# Patient Record
Sex: MALE | Race: WHITE | Employment: UNEMPLOYED | URBAN - METROPOLITAN AREA
[De-identification: names, ages, dates, MRNs, and addresses within clinical notes are randomized per-mention and may not be internally consistent; named-entity substitution may affect disease eponyms.]

---
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
## 2025-03-04 ENCOUNTER — HOSPITAL ENCOUNTER (EMERGENCY)
Age: 20
Discharge: HOME | End: 2025-03-04
Payer: COMMERCIAL

## 2025-03-04 VITALS
SYSTOLIC BLOOD PRESSURE: 139 MMHG | DIASTOLIC BLOOD PRESSURE: 68 MMHG | TEMPERATURE: 98.5 F | RESPIRATION RATE: 17 BRPM | HEART RATE: 75 BPM | OXYGEN SATURATION: 99 %

## 2025-03-04 DIAGNOSIS — B00.9 HERPES SIMPLEX: Primary | ICD-10-CM

## 2025-03-04 LAB
BILIRUB UR QL STRIP: NEGATIVE
CHLAMYDIA TRACHOMATIS MOLECULAR: NOT DETECTED
CLARITY UR: CLEAR
COLOR UR: YELLOW
COMMENT: ABNORMAL
GLUCOSE UR STRIP-MCNC: NEGATIVE MG/DL
HGB UR QL STRIP.AUTO: NEGATIVE
KETONES UR STRIP-MCNC: NEGATIVE MG/DL
LEUKOCYTE ESTERASE UR QL STRIP: NEGATIVE
NEISSERIA GONORRHOEAE MOLECULAR: NOT DETECTED
NITRITE UR QL STRIP: NEGATIVE
PH UR STRIP: 7 [PH] (ref 5–8)
PROT UR STRIP-MCNC: NEGATIVE MG/DL
SOURCE: NORMAL
SP GR UR STRIP: 1.01 (ref 1–1.03)
TRICHOMONAS VAGINALI, MOLECULAR: NOT DETECTED
UROBILINOGEN UR STRIP-ACNC: 2 EU/DL (ref 0–1)

## 2025-03-04 PROCEDURE — 81003 URINALYSIS AUTO W/O SCOPE: CPT

## 2025-03-04 PROCEDURE — 99283 EMERGENCY DEPT VISIT LOW MDM: CPT

## 2025-03-04 PROCEDURE — 87591 N.GONORRHOEAE DNA AMP PROB: CPT

## 2025-03-04 PROCEDURE — 87491 CHLMYD TRACH DNA AMP PROBE: CPT

## 2025-03-04 RX ORDER — VALACYCLOVIR HYDROCHLORIDE 1 G/1
500 TABLET, FILM COATED ORAL DAILY
Qty: 4 TABLET | Refills: 0 | Status: SHIPPED | OUTPATIENT
Start: 2025-03-04 | End: 2025-03-11

## 2025-03-04 ASSESSMENT — PAIN SCALES - GENERAL: PAINLEVEL_OUTOF10: 0

## 2025-03-04 ASSESSMENT — PAIN - FUNCTIONAL ASSESSMENT: PAIN_FUNCTIONAL_ASSESSMENT: 0-10

## 2025-03-04 NOTE — PROGRESS NOTES
Outpatient Pharmacy Progress Note for Meds-to-Beds    Total number of Prescriptions Filled: 1    Additional Documentation:  Delivered to patient in waiting room       Thank you for letting us serve your patients.  [facility] - [address] [address]    Phone: [phone]    Fax: [phone]

## 2025-03-05 NOTE — ED PROVIDER NOTES
SpO2: 99%        Patient seen and examined.  Work-up initiated secondary to presentation, physical exam findings, vital signs and medical chart review. Emergent etiologies considered.    Differential diagnosis include, but is not limited to, gonorrhea, chlamydia, HSV, syphilis, chancroid, trichomonas, lymphogranuloma venereum, hepatitis B, granuloma inguinale, HIV, Behcet's disease, and Arielle's gangrene.     Marcial Youssef is a 19 y.o. male who present to the emergency center who presents to the emergency department for his exposure to herpes.   Pt has easy nonlabored respirations.  Vital signs within acceptable parameters.  Patient is afebrile and in no acute distress. Pt hemodynamically stable and neurovascularly intact.  During assessment the patient is watching videos on his phone of why  homes cannot accept Brian Kongs body.    Is this patient to be included in the SEP-1 Core Measure due to severe sepsis or septic shock?   No   Exclusion criteria - the patient is NOT to be included for SEP-1 Core Measure due to:  2+ SIRS criteria are not met      This patient deniesdysuria/discharge-genital lesions.  He does not report that his recent sexual encounter told him that she had herpes.  We did discuss safe sexual practices including use of condoms.    Will send UA, GC gonorrhea/chlamydia.  Will treat with Valtrex.  Patient is discharged home with instructions to follow-up with health department.     Patient was treated the following medications:  Medications - No data to display        CONSULTS: (Who and What was discussed)  None            Disposition Considerations (include 1 Tests not done, Shared Decision Making, Pt Expectation of Test or Tx.): Appropriate for outpatient management. Shared decision making ensued. Patient/family comfortable with discharge. Strict ED return guidelines reviewed.  Pt discharged in stable condition with appropriate follow up.      FINAL IMPRESSION      1. Herpes

## 2025-03-08 ENCOUNTER — HOSPITAL ENCOUNTER (EMERGENCY)
Age: 20
Discharge: PSYCHIATRIC OR SUBSTANCE USE FACILITY | End: 2025-03-08
Attending: STUDENT IN AN ORGANIZED HEALTH CARE EDUCATION/TRAINING PROGRAM
Payer: COMMERCIAL

## 2025-03-08 VITALS
RESPIRATION RATE: 14 BRPM | OXYGEN SATURATION: 99 % | DIASTOLIC BLOOD PRESSURE: 76 MMHG | TEMPERATURE: 98.5 F | SYSTOLIC BLOOD PRESSURE: 128 MMHG | HEART RATE: 67 BPM

## 2025-03-08 DIAGNOSIS — R45.851 SUICIDAL IDEATION: Primary | ICD-10-CM

## 2025-03-08 LAB
ALBUMIN SERPL-MCNC: 4.4 G/DL (ref 3.4–5)
ALBUMIN/GLOB SERPL: 1.7 {RATIO} (ref 1.1–2.2)
ALP SERPL-CCNC: 93 U/L (ref 40–129)
ALT SERPL-CCNC: 11 U/L (ref 10–40)
AMPHET UR QL SCN: NEGATIVE
ANION GAP SERPL CALCULATED.3IONS-SCNC: 11 MMOL/L (ref 9–17)
APAP SERPL-MCNC: <5 UG/ML (ref 10–30)
AST SERPL-CCNC: 23 U/L (ref 15–37)
BARBITURATES UR QL SCN: NEGATIVE
BASOPHILS # BLD: 0.03 K/UL
BASOPHILS NFR BLD: 1 % (ref 0–1)
BENZODIAZ UR QL: NEGATIVE
BILIRUB SERPL-MCNC: 0.8 MG/DL (ref 0–1)
BILIRUB UR QL STRIP: ABNORMAL
BUN SERPL-MCNC: 8 MG/DL (ref 7–20)
CALCIUM SERPL-MCNC: 10.3 MG/DL (ref 8.3–10.6)
CANNABINOIDS UR QL SCN: NEGATIVE
CHLORIDE SERPL-SCNC: 106 MMOL/L (ref 99–110)
CLARITY UR: CLEAR
CO2 SERPL-SCNC: 25 MMOL/L (ref 21–32)
COCAINE UR QL SCN: NEGATIVE
COLOR UR: YELLOW
CREAT SERPL-MCNC: 0.8 MG/DL (ref 0.9–1.3)
EOSINOPHIL # BLD: 0.07 K/UL
EOSINOPHILS RELATIVE PERCENT: 1 % (ref 0–3)
EPI CELLS #/AREA URNS HPF: <1 /HPF
ERYTHROCYTE [DISTWIDTH] IN BLOOD BY AUTOMATED COUNT: 12.2 % (ref 11.7–14.9)
ETHANOLAMINE SERPL-MCNC: <10 MG/DL (ref 0–0.08)
FENTANYL UR QL: NEGATIVE
GFR, ESTIMATED: >90 ML/MIN/1.73M2
GLUCOSE SERPL-MCNC: 104 MG/DL (ref 74–99)
GLUCOSE UR STRIP-MCNC: NEGATIVE MG/DL
HCT VFR BLD AUTO: 46.5 % (ref 42–52)
HGB BLD-MCNC: 15.7 G/DL (ref 13.5–18)
HGB UR QL STRIP.AUTO: NEGATIVE
IMM GRANULOCYTES # BLD AUTO: 0.03 K/UL
IMM GRANULOCYTES NFR BLD: 1 %
KETONES UR STRIP-MCNC: ABNORMAL MG/DL
LEUKOCYTE ESTERASE UR QL STRIP: NEGATIVE
LYMPHOCYTES NFR BLD: 1.21 K/UL
LYMPHOCYTES RELATIVE PERCENT: 19 % (ref 24–44)
MCH RBC QN AUTO: 30.8 PG (ref 27–31)
MCHC RBC AUTO-ENTMCNC: 33.8 G/DL (ref 32–36)
MCV RBC AUTO: 91.4 FL (ref 78–100)
MONOCYTES NFR BLD: 0.5 K/UL
MONOCYTES NFR BLD: 8 % (ref 0–4)
MUCOUS THREADS URNS QL MICRO: ABNORMAL
NEUTROPHILS NFR BLD: 71 % (ref 36–66)
NEUTS SEG NFR BLD: 4.48 K/UL
NITRITE UR QL STRIP: NEGATIVE
OPIATES UR QL SCN: NEGATIVE
OXYCODONE UR QL SCN: NEGATIVE
PH UR STRIP: 7 [PH] (ref 5–8)
PLATELET # BLD AUTO: 151 K/UL (ref 140–440)
PMV BLD AUTO: 11.8 FL (ref 7.5–11.1)
POTASSIUM SERPL-SCNC: 4.2 MMOL/L (ref 3.5–5.1)
PROT SERPL-MCNC: 7 G/DL (ref 6.4–8.2)
PROT UR STRIP-MCNC: ABNORMAL MG/DL
RBC # BLD AUTO: 5.09 M/UL (ref 4.6–6.2)
RBC #/AREA URNS HPF: 2 /HPF (ref 0–2)
SALICYLATES SERPL-MCNC: <0.5 MG/DL (ref 15–30)
SODIUM SERPL-SCNC: 142 MMOL/L (ref 136–145)
SP GR UR STRIP: 1.02 (ref 1–1.03)
TEST INFORMATION: NORMAL
TSH SERPL DL<=0.05 MIU/L-ACNC: 0.65 UIU/ML (ref 0.27–4.2)
UROBILINOGEN UR STRIP-ACNC: 2 EU/DL (ref 0–1)
WBC #/AREA URNS HPF: 3 /HPF (ref 0–5)
WBC OTHER # BLD: 6.3 K/UL (ref 4–10.5)

## 2025-03-08 PROCEDURE — 81001 URINALYSIS AUTO W/SCOPE: CPT

## 2025-03-08 PROCEDURE — 80307 DRUG TEST PRSMV CHEM ANLYZR: CPT

## 2025-03-08 PROCEDURE — 84443 ASSAY THYROID STIM HORMONE: CPT

## 2025-03-08 PROCEDURE — G0480 DRUG TEST DEF 1-7 CLASSES: HCPCS

## 2025-03-08 PROCEDURE — 90791 PSYCH DIAGNOSTIC EVALUATION: CPT | Performed by: SOCIAL WORKER

## 2025-03-08 PROCEDURE — 99285 EMERGENCY DEPT VISIT HI MDM: CPT

## 2025-03-08 PROCEDURE — 80179 DRUG ASSAY SALICYLATE: CPT

## 2025-03-08 PROCEDURE — 80053 COMPREHEN METABOLIC PANEL: CPT

## 2025-03-08 PROCEDURE — 80143 DRUG ASSAY ACETAMINOPHEN: CPT

## 2025-03-08 PROCEDURE — 80061 LIPID PANEL: CPT

## 2025-03-08 PROCEDURE — 85025 COMPLETE CBC W/AUTO DIFF WBC: CPT

## 2025-03-08 ASSESSMENT — PAIN - FUNCTIONAL ASSESSMENT: PAIN_FUNCTIONAL_ASSESSMENT: NONE - DENIES PAIN

## 2025-03-08 NOTE — ED PROVIDER NOTES
eMERGENCY dEPARTMENT eNCOUnter    ATTENDING SIGN OUT NOTE    HPI/Physical Exam/Medical Decision Making  Time: 15:00  I have received sign out of Marcial Youssef's  Emergency Department care from Dr. Hemalatha Barreto. We discussed the history, physical exam, completed/pending test results (if obtained) and current treatment plan. Please refer to his/her chart for further details.     In brief, the patient is a 19 y.o. male who presented to the ED with increasing depression and suicidal ideation.  He has been pink slipped.  He is reported to me is medically cleared.  He has been evaluated by mental health who recommended inpatient psychiatric placement.  He is awaiting placement at this time.    17:55  The patient has been accepted to [facility] by Dr. Winston.  He is in stable condition awaiting transport.    Diagnostics:  Labs Reviewed   CBC WITH AUTO DIFFERENTIAL - Abnormal; Notable for the following components:       Result Value    MPV 11.8 (*)     Neutrophils % 71 (*)     Lymphocytes % 19 (*)     Monocytes % 8 (*)     Immature Granulocytes % 1 (*)     All other components within normal limits   COMPREHENSIVE METABOLIC PANEL W/ REFLEX TO MG FOR LOW K - Abnormal; Notable for the following components:    Glucose 104 (*)     Creatinine 0.8 (*)     All other components within normal limits   URINALYSIS - Abnormal; Notable for the following components:    Bilirubin, Urine SMALL (*)     Ketones, Urine TRACE (*)     Protein, UA TRACE (*)     Urobilinogen, Urine 2.0 (*)     All other components within normal limits   ACETAMINOPHEN LEVEL - Abnormal; Notable for the following components:    Acetaminophen Level <5 (*)     All other components within normal limits   SALICYLATE LEVEL - Abnormal; Notable for the following components:    Salicylate Lvl <0.5 (*)     All other components within normal limits   MICROSCOPIC URINALYSIS - Abnormal; Notable for the following components:    Mucus, UA OCCASIONAL (*)

## 2025-03-08 NOTE — ED TRIAGE NOTES
Pt arrived to the ED via walk in with c/o SI and HI. Pt states he has a hx of crack, heroine, and meth. Pt states he was clean for 10 months and relapsed and used crack 3 days ago. Pt states that the person he has been staying with kicked him out and today he attempted to hurt himself with a crow bar. Pt is very erratic. Pt states he has a hx of schizophrenia and has intent to \"slit his throat\" when this RN asked if the pt has attempted to do this in the past and pt stated yes, that he attempted to today as well. Pt story attempts to change as pt continues to talk to this RN.

## 2025-03-08 NOTE — ED PROVIDER NOTES
Emergency Department Encounter    Patient: Marcial Youssef  MRN: [id number]  : 2005  Date of Evaluation: 3/8/2025  ED Provider:  Gregorio Barreto MD    Triage Chief Complaint:   Mental Health Problem    [place]:  Marcial Youssef is a 19 y.o. male with history significant for autism spectrum disorder, reported schizophrenia, that presents for suicidal ideation.  The patient reports that for the last month he has had a progressively worsening of  profound sadness, anhedonia, poor sleep, poor appetite, and recurrent suicidal ideation, with the plan to shoot himself in the head.  He mentioned that he occasionally has auditory hallucinations, but is unable to specify the characteristics of them.  Denies visual hallucinations.  No homicidal ideation.  Mentions that he should be on medications, but he has been off them for about 8 months.  Endorses that has been trying to get at home to [place], but has been unable to do it, which is related to his symptoms as well.  No self-harm behavior.    Denies any respiratory, GI, urinary or infectious symptoms.    ROS - see HPI, below listed is current ROS at time of my eval:  Systems reviewed and negative except as above.     Past Medical History:   Diagnosis Date    Autism      No past surgical history on file.  No family history on file.  Social History     Socioeconomic History    Marital status: Single     Spouse name: Not on file    Number of children: 1    Years of education: Not on file    Highest education level: Not on file   Occupational History    Occupation: 24: Marcial states that he receives SSI   Tobacco Use    Smoking status: Every Day     Current packs/day: 1.00     Types: Cigarettes    Smokeless tobacco: Never   Vaping Use    Vaping status: Every Day   Substance and Sexual Activity    Alcohol use: Not Currently    Drug use: Yes     Types: Cocaine     Comment: 24: UDS is negative    Sexual activity: Yes     Partners: Female   Other  Reviewed:  No results found.        MDM:  CC/HPI Summary, Ddx: Patient that presents for suicidal ideation.  He is here in good clinical conditions, hemodynamically stable, with no medical complaints, and endorsing persistent SI.  Important conditions considered include suicidal ideation in the setting of major depressive disorder.  No evidence of delusions or disorganized behavior to suggest a psychotic disorder.  No history of harshad to suggest bipolar.  No toxidromes to suggest an acute intoxication, although considered the possibility of intoxication by CNS depressants such as benzodiazepines.  No medical complaints to suggest the presence of a decompensated medical condition.     Work-up and interventions performed in the ED include:  Labs: Requested CBC, CMP, acetaminophen, ethanol, salicylates, urinalysis, UDS.    Anticipate discussing the case with psychiatry once patient is medically cleared.    Pending work-up results will continue observation in the emergency department.     ED Course, and Reassessment:     ED Course as of 03/08/25 1318   Sat Mar 08, 2025   1120 Comprehensive Metabolic Panel w/ Reflex to MG(!):    Sodium 142   Potassium 4.2   Chloride 106   CARBON DIOXIDE 25   Anion Gap 11   Glucose 104(!)   BUN,BUNPL 8   Creatinine 0.8(!)   Est, Glom Filt Rate >90   Calcium 10.3   Total Protein 7.0   Albumin 4.4   Albumin/Globulin Ratio 1.7   Total Bilirubin 0.8   Alkaline Phosphatase 93   ALT 11   AST 23  Normal electrolytes, normal renal and hepatic function.  Normal serum glucose, bicarbonate negative. [SC]   1120 Salicyclic Acid(!): <0.5  Nondetectable salicylate [SC]   1120 Ethanol Lvl: <10  Nondetectable ethanol [SC]   1120 TSH, 3rd Generation: 0.65  Thyroid function normal [SC]   1121 Acetaminophen Level(!): <5  Nondetectable acetaminophen [SC]   1121 CBC with Auto Differential(!):    WBC 6.3   RBC 5.09   Hemoglobin Quant 15.7   Hematocrit 46.5   MCV 91.4   MCH 30.8   MCHC 33.8   RDW 12.2

## 2025-03-08 NOTE — ED NOTES
Transfer Center Handoff for Behavioral Health Transfers      Patient's Current Location: [facility] EMERGENCY DEPARTMENT     Chief Complaint   Patient presents with    Mental Health Problem       Current or History of Violent Behavior: No    Currently in Restraints Now or During this Encounter: No  (Specify if Agitation or self harm is noted in ED?)  If yes, please describe behaviors requiring restraint:             Medical Clearance Documented and Verified in the Chart: Yes    No Known Allergies     Can Patient Tolerate Lying Flat: Yes    Able to Perform ADLs:  Yes  (Specify if able to ambulate or uses any mobility devices such as cane or walker)  Activity: Ambulate in spencer, To bathroom, In bed  Level of Assistance:    Assistive Device:    Miscellaneous Devices:      LABS    CBC:   Lab Results   Component Value Date/Time    WBC 6.3 03/08/2025 10:14 AM    RBC 5.09 03/08/2025 10:14 AM    HGB 15.7 03/08/2025 10:14 AM    HCT 46.5 03/08/2025 10:14 AM    MCV 91.4 03/08/2025 10:14 AM    MCH 30.8 03/08/2025 10:14 AM    MCHC 33.8 03/08/2025 10:14 AM    RDW 12.2 03/08/2025 10:14 AM     03/08/2025 10:14 AM    MPV 11.8 03/08/2025 10:14 AM     CMP:   Lab Results   Component Value Date/Time     03/08/2025 10:14 AM    K 4.2 03/08/2025 10:14 AM    K 4.2 06/23/2024 04:56 PM     03/08/2025 10:14 AM    CO2 25 03/08/2025 10:14 AM    BUN 8 03/08/2025 10:14 AM    CREATININE 0.8 03/08/2025 10:14 AM    AGRATIO 1.7 06/23/2024 04:56 PM    LABGLOM >90 03/08/2025 10:14 AM    GLUCOSE 104 03/08/2025 10:14 AM    CALCIUM 10.3 03/08/2025 10:14 AM    BILITOT 0.8 03/08/2025 10:14 AM    ALKPHOS 93 03/08/2025 10:14 AM    AST 23 03/08/2025 10:14 AM    ALT 11 03/08/2025 10:14 AM     Drug Panel:   Lab Results   Component Value Date/Time    OPIAU NEGATIVE 03/08/2025 10:34 AM    LABCOMM  03/04/2025 08:45 AM     Microscopic exam not performed based on chemical results unless requested in original order.     UA:  Lab  Results   Component Value Date/Time    COLORU Yellow 03/08/2025 10:34 AM    PROTEINU TRACE 03/08/2025 10:34 AM    GLUCOSEU NEGATIVE 03/08/2025 10:34 AM    KETUA TRACE 03/08/2025 10:34 AM    BILIRUBINUR SMALL 03/08/2025 10:34 AM    UROBILINOGEN 2.0 03/08/2025 10:34 AM    NITRU NEGATIVE 03/08/2025 10:34 AM    LEUKOCYTESUR NEGATIVE 03/08/2025 10:34 AM    WBCUA 3 03/08/2025 10:34 AM    RBCUA 2 03/08/2025 10:34 AM     PREGNANCY TEST: No results found for: \"PREGTESTUR\"    Dialysis: No    Target Destination: Any    Insurance: Payor: GENERIC COMMERCIAL /  /  /      Current Psychotic Symptoms  Harmful Actions Towards Others:    Orientation Level:    Speech Pattern:    General Attitude:    Emotions:    Delusions:    Hallucination:       Any Suicidal Ideations: High Risk    Homicidal Ideations/Violence Risk:   Observed Violent Behavior:    Homicidal Ideations:      Past Psychiatric History:       Diagnosis Date    Autism        Hold (TDO/Involuntary Hold): No    The patient is not currently receiving care for the above psychiatric illness.     Home Medications  Does Patient Have Medications to Bring to the Facility: Yes  Medications Prior to Admission:   Prior to Admission Medications   Prescriptions Last Dose Informant Patient Reported? Taking?   OLANZapine (ZYPREXA) 5 MG tablet   No No   Sig: Take 1 tablet by mouth nightly   valACYclovir (VALTREX) 1 g tablet   No No   Sig: Take 0.5 tablets by mouth daily for 7 days      Facility-Administered Medications: None          Additional Information  Isolation:      HIV Positive: unknown    Oxygen Use: No    Any Legal Issues (Current or Past): unknown    Does Patient have POA or Guardian: No    Current Living Situation:      Roommate Appropriate: Yes    Is this a special case or have any other considerations:  No  (pregnancy, autism, developmental disabled, etc.)    Does the patient have confirmed or suspected COVID-19 Symptoms: No  (if yes, test must be completed, if no test is not

## 2025-03-08 NOTE — VIRTUAL HEALTH
Marcial Sofyasuzanne Mikael  [id number]  2005     Social Work Behavioral Health Crisis Assessment    03/08/25    Chief Complaint: Sucidal ideation with a plan     HPI: Patient is a 19 y.o. White (non-) male who presents for Sucidal ideation . Patient presented to the ED on 03/08/25 from home.       Per chart review, The patient reports that for the last month he has had a progressively worsening of profound sadness, anhedonia, poor sleep, poor appetite, and recurrent suicidal ideation, with the plan to shoot himself in the head. He mentioned that he occasionally has auditory hallucinations, but is unable to specify the characteristics of them.     Patient reported that he is depressed, and that he has been thinking about killing himself.  Patient reported that he has been thinking about killing himself with a crow-bar or gun. Patient reported that he is from [facility], and he wants to get home. Patient reported that he came to [place] because he was with a friend but keeps making bad decisions.     Grandfather [phone] reported safety concerns for the patient, he reported that the patient is impulsive and he is concerned that the patient will kill himself. Grandfather reported that no one in the family is able to drive to get the patient, and that he would not be able to be picked up by  anyone. Grandfather reported that the patient could benefit from an inpatient psychiatric admission due to concern for suicide.     Pt's symptoms are consistent with major depressive disorder severe with suicidal ideation and plan to kill self with a gun. This pt. would benefit from inpatient hospitalization  Discussed recommendations regarding inpatient admission with attending physician-Dr. Hemalatha Barreto    Past Psychiatric History:  Previous Diagnoses/symptoms: Denies  Previous suicide attempts/self-harm: Denies  Inpatient psychiatric hospitalizations: no  Current outpatient psychiatric provider:  electronic signature was used to authenticate this note.

## 2025-03-08 NOTE — ED NOTES
Can patient have 90 day supplies or 30?  Donna Garza RN     Report given to BARON Max. @ [facility].

## 2025-03-09 LAB
CHOLEST SERPL-MCNC: 135 MG/DL (ref 125–199)
HDLC SERPL-MCNC: 40 MG/DL
LDLC SERPL CALC-MCNC: 84 MG/DL
TRIGL SERPL-MCNC: 57 MG/DL

## 2025-03-10 ENCOUNTER — CLINICAL DOCUMENTATION (OUTPATIENT)
Dept: INTERNAL MEDICINE CLINIC | Age: 20
End: 2025-03-10